# Patient Record
Sex: MALE | Race: WHITE | NOT HISPANIC OR LATINO | Employment: FULL TIME | URBAN - METROPOLITAN AREA
[De-identification: names, ages, dates, MRNs, and addresses within clinical notes are randomized per-mention and may not be internally consistent; named-entity substitution may affect disease eponyms.]

---

## 2023-02-21 LAB — URINE CULTURE: NO GROWTH

## 2023-04-26 DIAGNOSIS — K21.00 GASTROESOPHAGEAL REFLUX DISEASE WITH ESOPHAGITIS WITHOUT HEMORRHAGE: Primary | ICD-10-CM

## 2023-04-26 RX ORDER — PANTOPRAZOLE SODIUM 40 MG/1
40 TABLET, DELAYED RELEASE ORAL
COMMUNITY
End: 2023-04-26 | Stop reason: SDUPTHER

## 2023-04-26 RX ORDER — PANTOPRAZOLE SODIUM 40 MG/1
40 TABLET, DELAYED RELEASE ORAL 2 TIMES DAILY
Qty: 60 TABLET | Refills: 5 | Status: SHIPPED | OUTPATIENT
Start: 2023-04-26 | End: 2023-05-24

## 2023-05-19 DIAGNOSIS — K21.00 GASTROESOPHAGEAL REFLUX DISEASE WITH ESOPHAGITIS WITHOUT HEMORRHAGE: ICD-10-CM

## 2023-05-24 RX ORDER — PANTOPRAZOLE SODIUM 40 MG/1
TABLET, DELAYED RELEASE ORAL
Qty: 60 TABLET | Refills: 5 | Status: SHIPPED | OUTPATIENT
Start: 2023-05-24 | End: 2023-10-16 | Stop reason: SDUPTHER

## 2023-10-16 ENCOUNTER — OFFICE VISIT (OUTPATIENT)
Dept: PRIMARY CARE | Facility: CLINIC | Age: 43
End: 2023-10-16
Payer: COMMERCIAL

## 2023-10-16 VITALS
DIASTOLIC BLOOD PRESSURE: 78 MMHG | BODY MASS INDEX: 32.71 KG/M2 | TEMPERATURE: 97.5 F | OXYGEN SATURATION: 99 % | WEIGHT: 208.4 LBS | HEART RATE: 84 BPM | HEIGHT: 67 IN | SYSTOLIC BLOOD PRESSURE: 122 MMHG

## 2023-10-16 DIAGNOSIS — K21.00 GASTROESOPHAGEAL REFLUX DISEASE WITH ESOPHAGITIS WITHOUT HEMORRHAGE: ICD-10-CM

## 2023-10-16 DIAGNOSIS — F41.1 GENERALIZED ANXIETY DISORDER: Primary | ICD-10-CM

## 2023-10-16 DIAGNOSIS — F84.0 AUTISM (HHS-HCC): ICD-10-CM

## 2023-10-16 PROCEDURE — 1036F TOBACCO NON-USER: CPT | Performed by: NURSE PRACTITIONER

## 2023-10-16 PROCEDURE — 99214 OFFICE O/P EST MOD 30 MIN: CPT | Performed by: NURSE PRACTITIONER

## 2023-10-16 RX ORDER — PANTOPRAZOLE SODIUM 40 MG/1
40 TABLET, DELAYED RELEASE ORAL 2 TIMES DAILY
Qty: 60 TABLET | Refills: 5 | Status: SHIPPED | OUTPATIENT
Start: 2023-10-16 | End: 2024-04-13

## 2023-10-16 RX ORDER — FAMOTIDINE 40 MG/1
40 TABLET, FILM COATED ORAL 2 TIMES DAILY
Qty: 60 TABLET | Refills: 5 | Status: SHIPPED | OUTPATIENT
Start: 2023-10-16 | End: 2023-11-09 | Stop reason: SDUPTHER

## 2023-10-16 ASSESSMENT — PATIENT HEALTH QUESTIONNAIRE - PHQ9
1. LITTLE INTEREST OR PLEASURE IN DOING THINGS: NOT AT ALL
SUM OF ALL RESPONSES TO PHQ9 QUESTIONS 1 AND 2: 0
2. FEELING DOWN, DEPRESSED OR HOPELESS: NOT AT ALL

## 2023-10-16 ASSESSMENT — PAIN SCALES - GENERAL: PAINLEVEL: 0-NO PAIN

## 2023-10-16 ASSESSMENT — ENCOUNTER SYMPTOMS
ABDOMINAL PAIN: 1
VOMITING: 1
DIARRHEA: 1

## 2023-10-16 NOTE — PATIENT INSTRUCTIONS
Resume taking pantoprazole twice daily for stomach upset.  Can use famotidine (pepcid) as needed for heartburn symptoms.  Avoid trigger foods.  Consider referral back to Gastroenterology if not improving.    Refer to [facility] for counseling and Psychiatry  Discussed importance of getting better control of anxiety for better control of CPAP use, stomach symptoms.

## 2023-10-16 NOTE — LETTER
October 16, 2023     Patient: Aaron Goldberg   YOB: 1980   Date of Visit: 10/16/2023       To Whom It May Concern:    Aaron Goldberg was seen in my clinic on 10/16/2023 at 11:20 am. Please excuse James for his absence from work on this day to make the appointment.  Patient may return to work as of 10-17-23.  If you have any questions or concerns, please don't hesitate to call.         Sincerely,         GIOVANNA Serna-CNP        CC: No Recipients

## 2023-10-16 NOTE — PROGRESS NOTES
"Subjective   Patient ID: Aaron Goldberg is a 42 y.o. male who presents for Nausea, Vomiting, Diarrhea, and Abdominal Pain. NMK    ,    Presents for evaluation of abdominal pain, stomach upset, nausea  +gas  +Acid reflux - Denies any current treatment  +diarrhea, no noted fever.  At this time still not feeling well  Seen by GI in the past. EGD done 11/2022  Hiatal hernia, gastritis found. No longer on PPI    Increased urinary frequency, ongoing. No changes from baseline per patient.    Vomiting   Associated symptoms include abdominal pain and diarrhea.   Diarrhea   Associated symptoms include abdominal pain and vomiting.   Abdominal Pain  Associated symptoms include diarrhea and vomiting.        Review of Systems   Gastrointestinal:  Positive for abdominal pain, diarrhea and vomiting.       Objective   /78 (BP Location: Right arm, Patient Position: Sitting, BP Cuff Size: Large adult)   Pulse 84   Temp 36.4 °C (97.5 °F) (Oral)   Ht 1.702 m (5' 7\")   Wt 94.5 kg (208 lb 6.4 oz)   SpO2 99%   BMI 32.64 kg/m²     Physical Exam  Vitals and nursing note reviewed.   Constitutional:       General: He is not in acute distress.     Appearance: Normal appearance.   Cardiovascular:      Rate and Rhythm: Normal rate and regular rhythm.      Pulses: Normal pulses.      Heart sounds: Normal heart sounds. No murmur heard.  Pulmonary:      Effort: Pulmonary effort is normal. No respiratory distress.      Breath sounds: Normal breath sounds.   Abdominal:      General: Bowel sounds are normal.      Palpations: Abdomen is soft.      Tenderness: There is abdominal tenderness (mild diffuse upper abdomen). There is no right CVA tenderness, left CVA tenderness or guarding.   Psychiatric:         Mood and Affect: Affect normal. Mood is anxious.         Behavior: Behavior normal.         Assessment/Plan   Diagnoses and all orders for this visit:  Generalized anxiety disorder  -     Referral to [facility]; " Future  Gastroesophageal reflux disease with esophagitis without hemorrhage  -     pantoprazole (ProtoNix) 40 mg EC tablet; Take 1 tablet (40 mg) by mouth 2 times a day. Do not crush, chew, or split.  -     famotidine (Pepcid) 40 mg tablet; Take 1 tablet (40 mg) by mouth 2 times a day.  Autism  -     Referral to [facility]; Future

## 2023-10-30 PROBLEM — J30.9 ALLERGIC RHINITIS: Status: ACTIVE | Noted: 2023-10-30

## 2023-10-30 PROBLEM — F32.A DEPRESSIVE DISORDER: Status: ACTIVE | Noted: 2022-11-08

## 2023-10-30 PROBLEM — B35.6 TINEA CRURIS: Status: ACTIVE | Noted: 2023-10-30

## 2023-10-30 PROBLEM — G47.33 OBSTRUCTIVE SLEEP APNEA SYNDROME: Status: ACTIVE | Noted: 2018-11-07

## 2023-10-30 PROBLEM — J31.0 CHRONIC RHINITIS: Status: ACTIVE | Noted: 2023-10-30

## 2023-10-30 PROBLEM — F41.9 ANXIETY: Status: ACTIVE | Noted: 2022-11-08

## 2023-10-30 PROBLEM — R23.8 SKIN IRRITATION: Status: ACTIVE | Noted: 2023-10-30

## 2023-10-30 PROBLEM — K29.00 ACUTE GASTRITIS: Status: RESOLVED | Noted: 2023-10-30 | Resolved: 2023-10-30

## 2023-10-30 PROBLEM — F51.04 CHRONIC INSOMNIA: Status: ACTIVE | Noted: 2018-11-07

## 2023-10-30 PROBLEM — R53.83 FATIGUE: Status: ACTIVE | Noted: 2022-11-08

## 2023-10-30 PROBLEM — S93.409A ANKLE SPRAIN: Status: ACTIVE | Noted: 2023-10-30

## 2023-10-30 PROBLEM — K29.00 ACUTE GASTRITIS: Status: ACTIVE | Noted: 2023-10-30

## 2023-10-30 PROBLEM — R35.0 URINARY FREQUENCY: Status: ACTIVE | Noted: 2023-10-30

## 2023-10-30 PROBLEM — R06.83 SNORING: Status: ACTIVE | Noted: 2023-10-30

## 2023-10-30 RX ORDER — TRAZODONE HYDROCHLORIDE 100 MG/1
200 TABLET ORAL NIGHTLY
COMMUNITY

## 2023-10-30 RX ORDER — BUSPIRONE HYDROCHLORIDE 5 MG/1
5 TABLET ORAL 2 TIMES DAILY
COMMUNITY

## 2023-10-30 RX ORDER — FLUOXETINE HYDROCHLORIDE 20 MG/1
20 CAPSULE ORAL DAILY
COMMUNITY
Start: 2023-10-16

## 2023-10-30 RX ORDER — FLUOXETINE HYDROCHLORIDE 40 MG/1
40 CAPSULE ORAL DAILY
COMMUNITY
Start: 2023-10-16

## 2023-10-31 ENCOUNTER — OFFICE VISIT (OUTPATIENT)
Dept: BEHAVIORAL HEALTH | Facility: CLINIC | Age: 43
End: 2023-10-31
Payer: COMMERCIAL

## 2023-10-31 DIAGNOSIS — G47.9 DISTURBANCE IN SLEEP BEHAVIOR: ICD-10-CM

## 2023-10-31 DIAGNOSIS — F32.A DEPRESSIVE DISORDER: ICD-10-CM

## 2023-10-31 DIAGNOSIS — F41.1 GENERALIZED ANXIETY DISORDER: Primary | ICD-10-CM

## 2023-10-31 DIAGNOSIS — F84.0 AUTISM (HHS-HCC): ICD-10-CM

## 2023-10-31 PROCEDURE — 1036F TOBACCO NON-USER: CPT

## 2023-10-31 PROCEDURE — 90792 PSYCH DIAG EVAL W/MED SRVCS: CPT

## 2023-10-31 NOTE — PROGRESS NOTES
Outpatient Psychiatry    Subjective   Aaron Goldberg, a 42 y.o. male, presenting to [facility] for evaluation.  Patient is referred by ANDREE Serna for anxiety.      HPI:  Stated that their primary concern is insomnia, 3-4 hours a night. Complains of not feeling well rested in the mornings. Often experiences nighttime awakenings to use the bathroom and then has difficulty falling back asleep. He has AIDE and uses CPAP routinely.     This has been going on for 3 weeks. He has experienced this in the past, it typically comes and goes. He has trazodone 100-200mg as needed for insomnia but does not like to use it because he is afraid he will become dependent. Of note, he recently used trazodone twice in the last week which resulted in better sleep.    He sees H[facility] CNP at [facility], who currently manages his psychotropic medications. He is prescribed fluoxetine 60mg, buspar 5mg BID and trazodone 200mg at bedtime. He last saw her on 10/9/23 and does not have scheduled follow up yet.    He reported medication adherence with fluoxetine for several months and only uses buspirone sporadically.     He reports anxiety which has recently been worse over the last 3 weeks in conjunction with the insomnia.     He denied any recent stressors but reports financial stress as an ongoing stressor for several years.     Regarding recent medical problems detailed at recent encounter with primary provider he said that nausea, vomiting and diarrhea have resolved. He said that he was also experiencing significant acid reflux but that it     PSYCHIATRIC REVIEW OF SYSTEMS  Depression: depressed mood, difficulty concentrating, thinking or making decisions, and sleep disturbance: average hours of sleep per night 3-4 and frequent awakening  Anxiety: excessive worry that is difficult to control, restlessness or feeling keyed up or on edge, fatigue, irritability, sleep disturbance, and panic attacks:  episodes of palpitations, tachycardia, shortness of breath , and x1-2/month   Crys: negative  Psychosis: negative  Delirium: negative   Trauma: negative     OCD: following schedules, start certain anxieties,   Current Medications:    Current Outpatient Medications:     acetaminophen 500 mg powder in packet, Take by mouth., Disp: , Rfl:     busPIRone (Buspar) 5 mg tablet, Take 1 tablet (5 mg) by mouth 2 times a day., Disp: , Rfl:     famotidine (Pepcid) 40 mg tablet, Take 1 tablet (40 mg) by mouth 2 times a day., Disp: 60 tablet, Rfl: 5    FLUoxetine (PROzac) 20 mg capsule, Take 1 capsule (20 mg) by mouth once daily., Disp: , Rfl:     FLUoxetine (PROzac) 40 mg capsule, Take 1 capsule (40 mg) by mouth once daily., Disp: , Rfl:     pantoprazole (ProtoNix) 40 mg EC tablet, Take 1 tablet (40 mg) by mouth 2 times a day. Do not crush, chew, or split., Disp: 60 tablet, Rfl: 5    traZODone (Desyrel) 100 mg tablet, Take 2 tablets (200 mg) by mouth once daily at bedtime., Disp: , Rfl:     Medical History:  Past Medical History:   Diagnosis Date    Personal history of other specified conditions 07/17/2022    History of diarrhea     Past Psychiatric History:   Diagnoses: MDD, RANDOLPH, OCD, autism spectrum disorder   Current Psychiatrist: Radha Peterson ([facility]), has been seeing her since February 2021  Therapy: none currently, negative experiences previously   Current psychiatric medications: buspirone 5mg BID, trazodone 200mg QHS  Past psychiatric medications: fluoxetine, seroquel, depakote, sertraline, escitalopram,   Past psychiatric treatments:   Hospitalizations: yes, 2011 for extreme anxiety at [facility]   Suicide attempts: denied  Family psychiatric history: bipolar disorder (mother)    Social History:   Currently lives: in an apartment in [place], lives alone  Education: English degree from [employer]  History of Learning Problem:  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  Behavior: Appropriate eye contact.   Motor Activity: No psychomotor agitation or retardation. No abnormal movements, tremors or tics. Gait not assessed.  Speech: Regular rate, rhythm, volume. Spontaneous, no pressured speech.  Mood: \"Okay\"  Affect: Restricted  Thought Process: Linear, logical, and goal-directed.   Thought Content: Denied current suicidal ideation or thoughts of harm to self, denied homicidal ideation or thoughts of harm to others. No delusional thinking elicited. No perseverations or obsessions identified.   Perception: Did not endorse auditory or visual hallucinations, did not appear to be responding to hallucinatory stimuli.   Cognition: Alert, oriented x3. Preserved attention span and concentration, recent and remote memory. Adequate fund of knowledge. No deficits in language.   Insight: Fair, in regards to understanding mental health condition  Judgement: Poor, reports ongoing anxiety and medication non-adherence.    Vitals:  There were no vitals filed for this visit.    Assessment/Plan   Aaron Goldberg, a 42 y.o. male with past psychiatric history of MDD, RANDOLPH, OCD and autism spectrum disorder, presenting to [facility] for evaluation. Patient is referred by ANDREE Serna for anxiety.      On initial assessment, patient endorsed ongoing depressed mood and anxiety in the setting " of medication non-adherence with prescribed psychotropic medications. He reported his primary concern as insomnia which has been exacerbated over the last 3 weeks. He is adherent with his CPAP but reports using trazodone as needed for insomnia sporadically. He would likely benefit from restarting fluoxetine and buspirone for depressed mood and anxiety but benefit could take 2-4 weeks. Instructed to use trazodone as needed in the interim. Additionally, discussed therapeutic benefit of psychotherapy but patient not interested in referral at this time due to negative experiences in the past. Discussed that he should reach out to his mental health provider to restart fluoxetine and titrate to an effective dose. No acute safety concerns at this time such as suicidal ideation.     Impression:  MDD   RANDOLPH   OCD   Autism spectrum disorder    Risk Assessment:  Risk of harm to self: Low Risk -- Risk factors include: Depression, History of not adhering to treatment or medication regimen , Psychosocial stressors including finances , and Severe anxiety Protective factors include:Denies current suicidal ideation, Denies history of suicide attempts , and Future-oriented talk     Risk of harm to others: Low Risk - Risk factors include: Gender. Protective factors include: Lack of known history of harm to others , Lack of known history of violent ideation , and Lack of known access to firearms     Plan/Recommendations:  Medications: reach out to Haily Peterson CNP at [facility] to restart fluoxetine and continue currently prescribed psychotropic medications as prescribed  Follow up: return to PCP for medical care and Haily Peterson CNP for further psychiatric care   Call  Psychiatry at [phone] with issues.  For [facility] residents, [employer] is a 24/7 hotline you can call for assistance [[phone]]. Please call 237/599 or go to your closest Emergency Room if you feel unsafe. This includes thoughts of  hurting yourself or anyone else, or having other troubles such as hearing voices, seeing visions, or having new and scary thoughts about the people around you.    Review with patient: Treatment plan reviewed with the patient.    Seen and discussed with attending, Dr. Thompson, who agrees with above.     Brock Espinal MD

## 2023-11-07 ENCOUNTER — TELEPHONE (OUTPATIENT)
Dept: PRIMARY CARE | Facility: CLINIC | Age: 43
End: 2023-11-07
Payer: COMMERCIAL

## 2023-11-09 DIAGNOSIS — K21.00 GASTROESOPHAGEAL REFLUX DISEASE WITH ESOPHAGITIS WITHOUT HEMORRHAGE: ICD-10-CM

## 2023-11-09 RX ORDER — FAMOTIDINE 40 MG/1
40 TABLET, FILM COATED ORAL 2 TIMES DAILY
Qty: 180 TABLET | Refills: 1 | Status: SHIPPED | OUTPATIENT
Start: 2023-11-09 | End: 2024-05-07

## 2023-12-22 ENCOUNTER — OFFICE VISIT (OUTPATIENT)
Dept: PRIMARY CARE | Facility: CLINIC | Age: 43
End: 2023-12-22
Payer: COMMERCIAL

## 2023-12-22 VITALS
OXYGEN SATURATION: 97 % | WEIGHT: 208 LBS | HEIGHT: 67 IN | BODY MASS INDEX: 32.65 KG/M2 | TEMPERATURE: 98.2 F | SYSTOLIC BLOOD PRESSURE: 120 MMHG | HEART RATE: 90 BPM | DIASTOLIC BLOOD PRESSURE: 78 MMHG

## 2023-12-22 DIAGNOSIS — R51.9 NONINTRACTABLE EPISODIC HEADACHE, UNSPECIFIED HEADACHE TYPE: Primary | ICD-10-CM

## 2023-12-22 DIAGNOSIS — K21.00 GASTROESOPHAGEAL REFLUX DISEASE WITH ESOPHAGITIS WITHOUT HEMORRHAGE: ICD-10-CM

## 2023-12-22 DIAGNOSIS — J01.10 ACUTE FRONTAL SINUSITIS, RECURRENCE NOT SPECIFIED: ICD-10-CM

## 2023-12-22 DIAGNOSIS — F51.04 CHRONIC INSOMNIA: ICD-10-CM

## 2023-12-22 PROCEDURE — 1036F TOBACCO NON-USER: CPT | Performed by: NURSE PRACTITIONER

## 2023-12-22 PROCEDURE — 99214 OFFICE O/P EST MOD 30 MIN: CPT | Performed by: NURSE PRACTITIONER

## 2023-12-22 RX ORDER — AMOXICILLIN AND CLAVULANATE POTASSIUM 875; 125 MG/1; MG/1
875 TABLET, FILM COATED ORAL 2 TIMES DAILY
Qty: 20 TABLET | Refills: 0 | Status: SHIPPED | OUTPATIENT
Start: 2023-12-22 | End: 2024-01-01

## 2023-12-22 ASSESSMENT — ENCOUNTER SYMPTOMS
DEPRESSION: 0
OCCASIONAL FEELINGS OF UNSTEADINESS: 0
LOSS OF SENSATION IN FEET: 0

## 2023-12-22 ASSESSMENT — PATIENT HEALTH QUESTIONNAIRE - PHQ9
2. FEELING DOWN, DEPRESSED OR HOPELESS: NOT AT ALL
1. LITTLE INTEREST OR PLEASURE IN DOING THINGS: NOT AT ALL
SUM OF ALL RESPONSES TO PHQ9 QUESTIONS 1 AND 2: 0

## 2023-12-22 ASSESSMENT — PAIN SCALES - GENERAL: PAINLEVEL: 0-NO PAIN

## 2023-12-22 NOTE — PATIENT INSTRUCTIONS
Headaches: Appears to be related to sinus infection.  Will start Augmentin for 10 days for infection.  Increase fluids while treating.  Encouraged to follow up with dental for routine dental exam and cleaning.  Follow up if headaches not improving with treating sinus symptoms.    Acid reflux: Recommend resuming pantoprazole daily.  Can continue to use famotidine, as needed for symptom flares.  Discussed importance of diet modification of avoidance of trigger foods to prevent flares.  Avoid caffeine, tobacco, alcohol, spicy foods (hot sauces, onions, garlic), fried foods, fatty foods, acidic foods (tomato, tomato sauces, citrus fruits and juices).  Avoid large meals. Avoid overeating.  Continue to sit up for at least 2-4 hours after eating.  Sleep with head of bed at least at 30 degrees to prevent reflux symptoms.  Consider H. Pylori testing - defer today due to antibiotic use.    Insomnia: Improving. Will watch with treating infection and reflux

## 2023-12-22 NOTE — LETTER
December 22, 2023     Patient: Aaron Goldberg   YOB: 1980   Date of Visit: 12/22/2023       To Whom It May Concern:    Aaron Goldberg was seen in my clinic on 12/22/2023. Please excuse James for his absence from work on this day to make the appointment.    If you have any questions or concerns, please don't hesitate to call.         Sincerely,         GIOVANNA Serna-CNP        CC: No Recipients

## 2023-12-22 NOTE — PROGRESS NOTES
"Subjective   Patient ID: Aaron Goldberg is a 43 y.o. male who presents for Headache, Stress, and Anxiety.    Presents for worsening recurrence of headaches.  Headache located in Middle forehead, over right frontal area. New watery eyes (left).  Recently had eye exam, new glasses --> No change in headaches.   Headaches are generally constant, worsening.   Using OTC tylenol, does not always help  Has used ice compression, works better than tylenol  Headaches on occasion, waking with them but worsening over the day  Denies any vision change, denies any nausea, vomiting.   +Nasal congestion, some but does not feel related  Eating regularly - eating less due to weight  Sleep is better  - trazodone has helped  Still wearing CPAP - on some days still getting up to pee overnight  Bad sleep still - using Z coumpound    Continues to have heartburn intermittently. Inconsistent triggers.  Is not currently taking any medication for it.  Does consume trigger foods.   EGD, GI testing reviewed from late 2022, GI notes.  Symptoms associated with belching, coughing.       Review of Systems    Objective   /78 (BP Location: Right arm, Patient Position: Sitting, BP Cuff Size: Large adult)   Pulse 90   Temp 36.8 °C (98.2 °F) (Temporal)   Ht 1.702 m (5' 7\")   Wt 94.3 kg (208 lb)   SpO2 97%   BMI 32.58 kg/m²     Physical Exam  Vitals and nursing note reviewed.   Constitutional:       General: He is not in acute distress.     Appearance: Normal appearance.   HENT:      Head: Normocephalic.      Right Ear: Ear canal and external ear normal. A middle ear effusion is present.      Left Ear: Tympanic membrane, ear canal and external ear normal.      Nose: Congestion present.      Right Turbinates: Swollen.      Left Turbinates: Swollen.      Right Sinus: Frontal sinus tenderness present.      Mouth/Throat:      Mouth: Mucous membranes are moist.   Eyes:      Conjunctiva/sclera: Conjunctivae normal.   Cardiovascular:      Rate and " Rhythm: Normal rate and regular rhythm.      Heart sounds: Normal heart sounds. No murmur heard.  Pulmonary:      Effort: Pulmonary effort is normal. No respiratory distress.      Breath sounds: Normal breath sounds.   Lymphadenopathy:      Cervical: Cervical adenopathy (R>L) present.   Psychiatric:         Mood and Affect: Mood normal.         Assessment/Plan   Diagnoses and all orders for this visit:  Nonintractable episodic headache, unspecified headache type  Acute frontal sinusitis, recurrence not specified  -     amoxicillin-pot clavulanate (Augmentin) 875-125 mg tablet; Take 1 tablet (875 mg) by mouth 2 times a day for 10 days.  Gastroesophageal reflux disease with esophagitis without hemorrhage  Chronic insomnia

## 2024-01-19 ENCOUNTER — OFFICE VISIT (OUTPATIENT)
Dept: PRIMARY CARE | Facility: CLINIC | Age: 44
End: 2024-01-19
Payer: COMMERCIAL

## 2024-01-19 VITALS
HEIGHT: 68 IN | HEART RATE: 72 BPM | BODY MASS INDEX: 30.74 KG/M2 | WEIGHT: 202.8 LBS | SYSTOLIC BLOOD PRESSURE: 116 MMHG | OXYGEN SATURATION: 96 % | DIASTOLIC BLOOD PRESSURE: 70 MMHG

## 2024-01-19 DIAGNOSIS — L30.9 ECZEMA, UNSPECIFIED TYPE: ICD-10-CM

## 2024-01-19 DIAGNOSIS — J30.9 ALLERGIC RHINITIS, UNSPECIFIED SEASONALITY, UNSPECIFIED TRIGGER: Primary | ICD-10-CM

## 2024-01-19 PROCEDURE — 99213 OFFICE O/P EST LOW 20 MIN: CPT | Performed by: NURSE PRACTITIONER

## 2024-01-19 PROCEDURE — 1036F TOBACCO NON-USER: CPT | Performed by: NURSE PRACTITIONER

## 2024-01-19 RX ORDER — FLUTICASONE PROPIONATE 50 MCG
1 SPRAY, SUSPENSION (ML) NASAL DAILY
Qty: 16 G | Refills: 5 | Status: SHIPPED | OUTPATIENT
Start: 2024-01-19 | End: 2025-01-18

## 2024-01-19 RX ORDER — TRIAMCINOLONE ACETONIDE 1 MG/G
CREAM TOPICAL 2 TIMES DAILY
Qty: 80 G | Refills: 2 | Status: SHIPPED | OUTPATIENT
Start: 2024-01-19

## 2024-01-19 NOTE — PROGRESS NOTES
"Subjective   Patient ID: Aaron Goldberg is a 43 y.o. male who presents for Eye Problem and Anxiety (Patient Presented with Puffiness in Both eyes More In Left eye. Patient has not Been Sleeping well but Sleeps Better With the c-jose rafael machine.  Patient has Compliant Of Dry skin he Has Exzema states the symptoms are worse this Year.).    Following up for anxiety.  Still not sleeping well, but admits has been better with using CPAP.  Ongoing eye puffiness, L>R  Headaches much better since antibiotic treatment.  Seen and treated for sinus infection one month ago, pressure improved after treatment, but eyes still not normal.  Eyes itching, feels pressure behind.  +sneezing, no watery eyes.  Does rub them a lot.  Has not tried any eye drops. Cannot use eye drops per patient.  Did well with Flonase in the past.    Continues to have dry skin, using Vaseline at work.   Eczema symptoms worse this year. Has had to start using at home.   Has itching and cracks in skin.     Both eyes but more on the left side      Review of Systems    Objective   /70 (BP Location: Left arm, Patient Position: Sitting, BP Cuff Size: Adult)   Pulse 72   Ht 1.727 m (5' 8\")   Wt 92 kg (202 lb 12.8 oz)   SpO2 96%   BMI 30.84 kg/m²     Physical Exam  Vitals and nursing note reviewed.   Constitutional:       General: He is not in acute distress.     Appearance: Normal appearance. He is not toxic-appearing.   HENT:      Head: Normocephalic.      Right Ear: Ear canal and external ear normal. A middle ear effusion is present.      Left Ear: Ear canal and external ear normal. A middle ear effusion is present.      Nose: Rhinorrhea present. No congestion.      Mouth/Throat:      Lips: Pink.      Mouth: Mucous membranes are moist.      Pharynx: Oropharynx is clear. No posterior oropharyngeal erythema.   Eyes:      General: Lids are normal. Allergic shiner present.         Right eye: No discharge.         Left eye: No discharge.      " Conjunctiva/sclera:      Right eye: Right conjunctiva is not injected.      Left eye: Left conjunctiva is not injected.   Cardiovascular:      Rate and Rhythm: Normal rate and regular rhythm.      Heart sounds: Normal heart sounds. No murmur heard.  Pulmonary:      Effort: Pulmonary effort is normal. No respiratory distress.      Breath sounds: Normal breath sounds.   Lymphadenopathy:      Cervical: No cervical adenopathy.   Skin:     General: Skin is warm.      Findings: Rash (bilateral hand eczema) present.   Psychiatric:         Mood and Affect: Mood normal.         Assessment/Plan   Diagnoses and all orders for this visit:  Allergic rhinitis, unspecified seasonality, unspecified trigger  -     fluticasone (Flonase) 50 mcg/actuation nasal spray; Administer 1 spray into each nostril once daily. Shake gently. Before first use, prime pump. After use, clean tip and replace cap.  Eczema, unspecified type  -     triamcinolone (Kenalog) 0.1 % cream; Apply topically 2 times a day. Apply to affected area 1-2 times daily as needed.

## 2024-01-19 NOTE — PATIENT INSTRUCTIONS
Eczema: Continue to use Vaseline throughout the day.  Use Triamcinolone twice daily to help heal eczema rash.  Avoid using harsh soaps on hands, when able.    Start flonase nasal spray for allergy symptoms.  Consider addition of eye drops if eye itching not improving with allergy treatment.

## 2024-01-19 NOTE — LETTER
January 19, 2024     Patient: Aaron Goldberg   YOB: 1980   Date of Visit: 1/19/2024       To Whom It May Concern:    Aaron Goldberg was seen in my clinic on 1/19/2024. Please excuse James for his absence from work on this day to make the appointment. He may return to work 1/22/2024.    If you have any questions or concerns, please don't hesitate to call.         Sincerely,         GIOVANNA Serna-CNP        CC: No Recipients

## 2024-02-13 DIAGNOSIS — H10.13 ALLERGIC CONJUNCTIVITIS OF BOTH EYES: Primary | ICD-10-CM

## 2024-03-19 ENCOUNTER — APPOINTMENT (OUTPATIENT)
Dept: OPHTHALMOLOGY | Facility: CLINIC | Age: 44
End: 2024-03-19
Payer: COMMERCIAL

## 2024-07-01 ENCOUNTER — APPOINTMENT (OUTPATIENT)
Dept: OPHTHALMOLOGY | Facility: CLINIC | Age: 44
End: 2024-07-01
Payer: COMMERCIAL

## 2024-07-09 ENCOUNTER — TELEPHONE (OUTPATIENT)
Dept: PRIMARY CARE | Facility: CLINIC | Age: 44
End: 2024-07-09
Payer: COMMERCIAL

## 2024-07-09 DIAGNOSIS — R35.0 URINARY FREQUENCY: Primary | ICD-10-CM

## 2024-07-09 NOTE — TELEPHONE ENCOUNTER
Patient called wanting to know if they can start oxybutin. Psychiatrist wants to make sure everything is okay before they start

## 2024-07-29 ENCOUNTER — LAB (OUTPATIENT)
Dept: LAB | Facility: LAB | Age: 44
End: 2024-07-29
Payer: COMMERCIAL

## 2024-07-29 DIAGNOSIS — R35.0 URINARY FREQUENCY: ICD-10-CM

## 2024-07-29 LAB
ALBUMIN SERPL BCP-MCNC: 4.4 G/DL (ref 3.4–5)
ALP SERPL-CCNC: 66 U/L (ref 33–120)
ALT SERPL W P-5'-P-CCNC: 14 U/L (ref 10–52)
ANION GAP SERPL CALC-SCNC: 14 MMOL/L (ref 10–20)
AST SERPL W P-5'-P-CCNC: 14 U/L (ref 9–39)
BASOPHILS # BLD AUTO: 0.04 X10*3/UL (ref 0–0.1)
BASOPHILS NFR BLD AUTO: 0.5 %
BILIRUB SERPL-MCNC: 0.3 MG/DL (ref 0–1.2)
BUN SERPL-MCNC: 10 MG/DL (ref 6–23)
CALCIUM SERPL-MCNC: 9.5 MG/DL (ref 8.6–10.6)
CHLORIDE SERPL-SCNC: 103 MMOL/L (ref 98–107)
CO2 SERPL-SCNC: 26 MMOL/L (ref 21–32)
CREAT SERPL-MCNC: 0.63 MG/DL (ref 0.5–1.3)
EGFRCR SERPLBLD CKD-EPI 2021: >90 ML/MIN/1.73M*2
EOSINOPHIL # BLD AUTO: 0.06 X10*3/UL (ref 0–0.7)
EOSINOPHIL NFR BLD AUTO: 0.8 %
ERYTHROCYTE [DISTWIDTH] IN BLOOD BY AUTOMATED COUNT: 11.3 % (ref 11.5–14.5)
GLUCOSE SERPL-MCNC: 107 MG/DL (ref 74–99)
HCT VFR BLD AUTO: 44.1 % (ref 41–52)
HGB BLD-MCNC: 15 G/DL (ref 13.5–17.5)
IMM GRANULOCYTES # BLD AUTO: 0.02 X10*3/UL (ref 0–0.7)
IMM GRANULOCYTES NFR BLD AUTO: 0.3 % (ref 0–0.9)
LYMPHOCYTES # BLD AUTO: 1.38 X10*3/UL (ref 1.2–4.8)
LYMPHOCYTES NFR BLD AUTO: 18.4 %
MCH RBC QN AUTO: 29.5 PG (ref 26–34)
MCHC RBC AUTO-ENTMCNC: 34 G/DL (ref 32–36)
MCV RBC AUTO: 87 FL (ref 80–100)
MONOCYTES # BLD AUTO: 0.52 X10*3/UL (ref 0.1–1)
MONOCYTES NFR BLD AUTO: 6.9 %
NEUTROPHILS # BLD AUTO: 5.47 X10*3/UL (ref 1.2–7.7)
NEUTROPHILS NFR BLD AUTO: 73.1 %
NRBC BLD-RTO: 0 /100 WBCS (ref 0–0)
PLATELET # BLD AUTO: 291 X10*3/UL (ref 150–450)
POTASSIUM SERPL-SCNC: 4 MMOL/L (ref 3.5–5.3)
PROT SERPL-MCNC: 7.2 G/DL (ref 6.4–8.2)
PSA SERPL-MCNC: 0.55 NG/ML
RBC # BLD AUTO: 5.09 X10*6/UL (ref 4.5–5.9)
SODIUM SERPL-SCNC: 139 MMOL/L (ref 136–145)
WBC # BLD AUTO: 7.5 X10*3/UL (ref 4.4–11.3)

## 2024-07-29 PROCEDURE — 84153 ASSAY OF PSA TOTAL: CPT

## 2024-07-29 PROCEDURE — 85025 COMPLETE CBC W/AUTO DIFF WBC: CPT

## 2024-07-29 PROCEDURE — 80053 COMPREHEN METABOLIC PANEL: CPT

## 2024-07-29 PROCEDURE — 36415 COLL VENOUS BLD VENIPUNCTURE: CPT

## 2024-08-09 ENCOUNTER — APPOINTMENT (OUTPATIENT)
Dept: OPHTHALMOLOGY | Facility: CLINIC | Age: 44
End: 2024-08-09
Payer: COMMERCIAL

## 2024-08-09 DIAGNOSIS — Z98.890 S/P LASIK SURGERY: ICD-10-CM

## 2024-08-09 DIAGNOSIS — H52.13 MYOPIA OF BOTH EYES: Primary | ICD-10-CM

## 2024-08-09 PROCEDURE — 92004 COMPRE OPH EXAM NEW PT 1/>: CPT | Performed by: OPHTHALMOLOGY

## 2024-08-09 PROCEDURE — 92015 DETERMINE REFRACTIVE STATE: CPT | Performed by: OPHTHALMOLOGY

## 2024-08-09 RX ORDER — DOXEPIN HYDROCHLORIDE 25 MG/1
25 CAPSULE ORAL NIGHTLY
COMMUNITY
Start: 2024-07-05

## 2024-08-09 RX ORDER — SERTRALINE HYDROCHLORIDE 50 MG/1
50 TABLET, FILM COATED ORAL NIGHTLY
COMMUNITY
Start: 2024-07-23

## 2024-08-09 RX ORDER — AMITRIPTYLINE HYDROCHLORIDE 25 MG/1
1 TABLET, FILM COATED ORAL
COMMUNITY
Start: 2024-07-23

## 2024-08-09 ASSESSMENT — REFRACTION_MANIFEST
OS_AXIS: 050
OS_SPHERE: -4.00
OD_CYLINDER: -0.75
OS_ADD: +1.25
OD_ADD: +1.25
OD_SPHERE: -5.00
OD_AXIS: 155
OS_CYLINDER: -0.75

## 2024-08-09 ASSESSMENT — VISUAL ACUITY
OS_CC+: -2
OD_CC+: -1
CORRECTION_TYPE: GLASSES
METHOD: SNELLEN - LINEAR
OS_CC: 20/20
OD_CC: 20/20

## 2024-08-09 ASSESSMENT — REFRACTION_WEARINGRX
OS_CYLINDER: -0.25
OD_AXIS: 155
OS_AXIS: 043
OD_ADD: +1.00
OD_CYLINDER: -0.25
OS_SPHERE: -4.25
OS_ADD: +1.00
OD_SPHERE: -5.25

## 2024-08-09 ASSESSMENT — CONF VISUAL FIELD
OD_SUPERIOR_TEMPORAL_RESTRICTION: 0
OS_INFERIOR_TEMPORAL_RESTRICTION: 0
METHOD: COUNTING FINGERS
OD_NORMAL: 1
OS_SUPERIOR_NASAL_RESTRICTION: 0
OS_SUPERIOR_TEMPORAL_RESTRICTION: 0
OD_INFERIOR_TEMPORAL_RESTRICTION: 0
OD_INFERIOR_NASAL_RESTRICTION: 0
OS_NORMAL: 1
OD_SUPERIOR_NASAL_RESTRICTION: 0
OS_INFERIOR_NASAL_RESTRICTION: 0

## 2024-08-09 ASSESSMENT — ENCOUNTER SYMPTOMS
RESPIRATORY NEGATIVE: 0
HEMATOLOGIC/LYMPHATIC NEGATIVE: 0
NEUROLOGICAL NEGATIVE: 0
ALLERGIC/IMMUNOLOGIC NEGATIVE: 0
EYES NEGATIVE: 0
GASTROINTESTINAL NEGATIVE: 0
ENDOCRINE NEGATIVE: 0
PSYCHIATRIC NEGATIVE: 0
CONSTITUTIONAL NEGATIVE: 0
CARDIOVASCULAR NEGATIVE: 0
MUSCULOSKELETAL NEGATIVE: 0

## 2024-08-09 ASSESSMENT — TONOMETRY
OD_IOP_MMHG: 15
OS_IOP_MMHG: 15
IOP_METHOD: GOLDMANN APPLANATION

## 2024-08-09 ASSESSMENT — SLIT LAMP EXAM - LIDS
COMMENTS: NORMAL
COMMENTS: NORMAL

## 2024-08-09 ASSESSMENT — EXTERNAL EXAM - LEFT EYE: OS_EXAM: NORMAL

## 2024-08-09 ASSESSMENT — EXTERNAL EXAM - RIGHT EYE: OD_EXAM: NORMAL

## 2024-08-09 NOTE — PROGRESS NOTES
Assessment/Plan   Diagnoses and all orders for this visit:  Myopia of both eyes  S/P LASIK surgery  Glasses prescription given to patient today   Discussed symptoms of hole tear and retinal detachment:floaters, flashes, curtain or veil over vision or missing areas of vision  Call for any new symptoms or vision loss

## 2024-10-24 ENCOUNTER — APPOINTMENT (OUTPATIENT)
Dept: PRIMARY CARE | Facility: CLINIC | Age: 44
End: 2024-10-24
Payer: COMMERCIAL

## 2024-10-30 ENCOUNTER — APPOINTMENT (OUTPATIENT)
Dept: PRIMARY CARE | Facility: CLINIC | Age: 44
End: 2024-10-30
Payer: COMMERCIAL

## 2024-10-30 VITALS
SYSTOLIC BLOOD PRESSURE: 122 MMHG | WEIGHT: 202 LBS | BODY MASS INDEX: 30.62 KG/M2 | HEART RATE: 74 BPM | DIASTOLIC BLOOD PRESSURE: 74 MMHG | OXYGEN SATURATION: 96 % | TEMPERATURE: 97.1 F | HEIGHT: 68 IN

## 2024-10-30 DIAGNOSIS — Z00.00 ROUTINE GENERAL MEDICAL EXAMINATION AT A HEALTH CARE FACILITY: Primary | ICD-10-CM

## 2024-10-30 DIAGNOSIS — E66.811 CLASS 1 OBESITY DUE TO EXCESS CALORIES WITHOUT SERIOUS COMORBIDITY WITH BODY MASS INDEX (BMI) OF 30.0 TO 30.9 IN ADULT: ICD-10-CM

## 2024-10-30 DIAGNOSIS — E66.09 CLASS 1 OBESITY DUE TO EXCESS CALORIES WITHOUT SERIOUS COMORBIDITY WITH BODY MASS INDEX (BMI) OF 30.0 TO 30.9 IN ADULT: ICD-10-CM

## 2024-10-30 DIAGNOSIS — L30.9 DERMATITIS: ICD-10-CM

## 2024-10-30 DIAGNOSIS — J30.9 ALLERGIC RHINITIS, UNSPECIFIED SEASONALITY, UNSPECIFIED TRIGGER: ICD-10-CM

## 2024-10-30 DIAGNOSIS — K21.00 GASTROESOPHAGEAL REFLUX DISEASE WITH ESOPHAGITIS WITHOUT HEMORRHAGE: ICD-10-CM

## 2024-10-30 DIAGNOSIS — Z23 ENCOUNTER FOR IMMUNIZATION: ICD-10-CM

## 2024-10-30 PROCEDURE — 99396 PREV VISIT EST AGE 40-64: CPT | Performed by: NURSE PRACTITIONER

## 2024-10-30 PROCEDURE — 90480 ADMN SARSCOV2 VAC 1/ONLY CMP: CPT | Performed by: NURSE PRACTITIONER

## 2024-10-30 PROCEDURE — 3008F BODY MASS INDEX DOCD: CPT | Performed by: NURSE PRACTITIONER

## 2024-10-30 PROCEDURE — 90656 IIV3 VACC NO PRSV 0.5 ML IM: CPT | Performed by: NURSE PRACTITIONER

## 2024-10-30 PROCEDURE — 90471 IMMUNIZATION ADMIN: CPT | Performed by: NURSE PRACTITIONER

## 2024-10-30 PROCEDURE — 91320 SARSCV2 VAC 30MCG TRS-SUC IM: CPT | Performed by: NURSE PRACTITIONER

## 2024-10-30 RX ORDER — FLUTICASONE PROPIONATE 50 MCG
1 SPRAY, SUSPENSION (ML) NASAL DAILY
Qty: 16 G | Refills: 5 | Status: SHIPPED | OUTPATIENT
Start: 2024-10-30 | End: 2025-10-30

## 2024-10-30 RX ORDER — NYSTATIN AND TRIAMCINOLONE ACETONIDE 100000; 1 [USP'U]/G; MG/G
CREAM TOPICAL 2 TIMES DAILY
Qty: 60 G | Refills: 1 | Status: SHIPPED | OUTPATIENT
Start: 2024-10-30

## 2024-10-30 RX ORDER — DEXLANSOPRAZOLE 60 MG/1
60 CAPSULE, DELAYED RELEASE ORAL DAILY
Qty: 30 CAPSULE | Refills: 5 | Status: SHIPPED | OUTPATIENT
Start: 2024-10-30 | End: 2025-04-28

## 2024-10-30 ASSESSMENT — ENCOUNTER SYMPTOMS
SHORTNESS OF BREATH: 0
DIARRHEA: 0
NAUSEA: 0
MYALGIAS: 0
PALPITATIONS: 0
ARTHRALGIAS: 0
VOMITING: 0
UNEXPECTED WEIGHT CHANGE: 0
FREQUENCY: 0
APPETITE CHANGE: 0
BACK PAIN: 0
LOSS OF SENSATION IN FEET: 0
CONSTIPATION: 0
FATIGUE: 0
OCCASIONAL FEELINGS OF UNSTEADINESS: 0
COUGH: 0
BLOOD IN STOOL: 0
DEPRESSION: 0
NERVOUS/ANXIOUS: 0
DYSURIA: 0

## 2024-10-30 ASSESSMENT — PAIN SCALES - GENERAL: PAINLEVEL_OUTOF10: 0-NO PAIN

## 2024-10-30 NOTE — LETTER
October 30, 2024     Patient: Aaron Goldberg   YOB: 1980   Date of Visit: 10/30/2024       To Whom It May Concern:    Aaron Goldberg was seen in my clinic on 10/30/2024 at 3:00 pm. Please excuse James for his absence from work on this day to make the appointment.    If you have any questions or concerns, please don't hesitate to call.         Sincerely,         Heidi Yoo, GIOVANNA-CNP        CC: No Recipients

## 2024-10-30 NOTE — PATIENT INSTRUCTIONS
It is important that you have yearly check-ups with your healthcare provider to ensure that you stay up to date on your health, screenings, and vaccinations, even if you feel healthy.     Make sure you eat a diet rich in fruits, vegetables, nuts, beans, whole grains, lean meat, eggs, calcium, and good fats (i.e. olive oil, avocado baked fish). AVOID a diet that is high in red meat, trans- and saturated fats, sweetened beverages, and refined grains (i.e. white bread, rice, sweetened cereals).     Stay hydrated with at least 64 ounces of water daily.    Exercise most days per week, for at least 30-45 minutes per session.     Be sure to wear sunscreen of SPF of 15 or higher if you are going to be outside.    Stress management and coping skills are important to manage our stress levels. Some tips include keep in mind that stressFasr isn't a bad thing, talk about your problems, prioritize your responsibilities, focus on the basics, don't put all your eggs in one basket, set aside time for yourself, and keep things in perspective.     Stay up to date with annual eye exams and twice yearly dental exams.    Recommend annual flu vaccination, in addition to age appropriate recommended vaccines.  Flu shot and Covid booster today    For acid reflux symptoms, will submit to start Dexilant for persisting symptoms of gastritis  Continue to avoid trigger foods, over eating    Start mycolog for rash on legs/groin     Return for fasting blood work    Follow up for annual well check in 1 year.

## 2024-10-30 NOTE — PROGRESS NOTES
Subjective   Aaron Goldberg is a 44 y.o. male and is here for a comprehensive physical exam. The patient reports no problems.    Ongoing acid reflux    Sleeping still on and off with symptoms    CPAP stopped due to not working  Following with sleep medicine    Seeing dental regularly  Related to bite - no oral appliance - invisalign ?    Occasional headaches    Do you have pain that bothers you in your daily life? yes    Review of Systems   Constitutional:  Negative for appetite change, fatigue and unexpected weight change.   HENT:  Negative for congestion, ear pain and hearing loss.    Eyes:  Negative for visual disturbance.   Respiratory:  Negative for cough and shortness of breath.    Cardiovascular:  Negative for chest pain, palpitations and leg swelling.   Gastrointestinal:  Positive for abdominal pain. Negative for blood in stool, constipation, diarrhea, nausea and vomiting.   Genitourinary:  Negative for dysuria, frequency and urgency.   Musculoskeletal:  Negative for arthralgias, back pain and myalgias.   Skin:  Negative for rash.   Neurological:  Positive for headaches. Negative for syncope.   Psychiatric/Behavioral:  The patient is not nervous/anxious.         Objective   Physical Exam  Vitals and nursing note reviewed.   Constitutional:       General: He is not in acute distress.     Appearance: Normal appearance. He is not toxic-appearing.   HENT:      Head: Normocephalic.      Right Ear: Tympanic membrane, ear canal and external ear normal.      Left Ear: Tympanic membrane, ear canal and external ear normal.      Nose: Nose normal.      Mouth/Throat:      Mouth: Mucous membranes are moist.      Pharynx: Oropharynx is clear.   Eyes:      Conjunctiva/sclera: Conjunctivae normal.   Neck:      Thyroid: No thyromegaly.   Cardiovascular:      Rate and Rhythm: Normal rate and regular rhythm.      Pulses: Normal pulses.      Heart sounds: Normal heart sounds. No murmur heard.  Pulmonary:      Effort: Pulmonary  effort is normal. No respiratory distress.      Breath sounds: Normal breath sounds.   Abdominal:      General: Bowel sounds are normal.      Palpations: Abdomen is soft.      Tenderness: There is no abdominal tenderness.   Musculoskeletal:         General: Normal range of motion.      Cervical back: Neck supple.   Lymphadenopathy:      Cervical: No cervical adenopathy.   Skin:     General: Skin is warm and dry.      Capillary Refill: Capillary refill takes less than 2 seconds.      Findings: Rash (groin, inner thighs: erythema, excoriation) present.   Neurological:      General: No focal deficit present.      Gait: Gait normal.   Psychiatric:         Mood and Affect: Mood normal.         Judgment: Judgment normal.         Assessment/Plan   Healthy male exam.     1. Follow up with Sleep Medicine for sleep apnea management options  2. Patient Counseling:  --Nutrition: Stressed importance of moderation in sodium/caffeine intake, saturated fat and cholesterol, caloric balance, sufficient intake of fresh fruits, vegetables, fiber, calcium, iron, and 1 mg of folate supplement per day (for females capable of pregnancy).  --Discussed the issue of estrogen replacement, calcium supplement, and the daily use of baby aspirin.  --Exercise: Stressed the importance of regular exercise.   --Substance Abuse: Discussed cessation/primary prevention of tobacco, alcohol, or other drug use; driving or other dangerous activities under the influence; availability of treatment for abuse.    --Sexuality: Discussed sexually transmitted diseases, partner selection, use of condoms, avoidance of unintended pregnancy  and contraceptive alternatives.   --Injury prevention: Discussed safety belts, safety helmets, smoke detector, smoking near bedding or upholstery.   --Dental health: Discussed importance of regular tooth brushing, flossing, and dental visits.  --Immunizations reviewed.  --Discussed benefits of screening colonoscopy.  --After hours  service discussed with patient  3. Discussed the patient's BMI with him.  The BMI is above average. The patient received Provided instructions on dietary changes  Provided instructions on exercise  Advised to Increase physical activity because they have an above normal BMI.  4. Follow up next physical in 1 year    Patient Instructions   It is important that you have yearly check-ups with your healthcare provider to ensure that you stay up to date on your health, screenings, and vaccinations, even if you feel healthy.     Make sure you eat a diet rich in fruits, vegetables, nuts, beans, whole grains, lean meat, eggs, calcium, and good fats (i.e. olive oil, avocado baked fish). AVOID a diet that is high in red meat, trans- and saturated fats, sweetened beverages, and refined grains (i.e. white bread, rice, sweetened cereals).     Stay hydrated with at least 64 ounces of water daily.    Exercise most days per week, for at least 30-45 minutes per session.     Be sure to wear sunscreen of SPF of 15 or higher if you are going to be outside.    Stress management and coping skills are important to manage our stress levels. Some tips include keep in mind that stressFasr isn't a bad thing, talk about your problems, prioritize your responsibilities, focus on the basics, don't put all your eggs in one basket, set aside time for yourself, and keep things in perspective.     Stay up to date with annual eye exams and twice yearly dental exams.    Recommend annual flu vaccination, in addition to age appropriate recommended vaccines.  Flu shot and Covid booster today    For acid reflux symptoms, will submit to start Dexilant for persisting symptoms of gastritis  Continue to avoid trigger foods, over eating    Start mycolog for rash on legs/groin     Return for fasting blood work    Follow up for annual well check in 1 year.

## 2025-02-06 ASSESSMENT — ENCOUNTER SYMPTOMS
HEADACHES: 1
ABDOMINAL PAIN: 1

## 2025-03-09 ENCOUNTER — OFFICE VISIT (OUTPATIENT)
Dept: URGENT CARE | Age: 45
End: 2025-03-09
Payer: COMMERCIAL

## 2025-03-09 VITALS
TEMPERATURE: 97.3 F | HEART RATE: 83 BPM | DIASTOLIC BLOOD PRESSURE: 87 MMHG | OXYGEN SATURATION: 95 % | RESPIRATION RATE: 18 BRPM | SYSTOLIC BLOOD PRESSURE: 122 MMHG

## 2025-03-09 DIAGNOSIS — R21 RASH AND NONSPECIFIC SKIN ERUPTION: Primary | ICD-10-CM

## 2025-03-09 PROCEDURE — 99203 OFFICE O/P NEW LOW 30 MIN: CPT | Performed by: NURSE PRACTITIONER

## 2025-03-09 PROCEDURE — 1036F TOBACCO NON-USER: CPT | Performed by: NURSE PRACTITIONER

## 2025-03-09 RX ORDER — HYDROXYZINE PAMOATE 25 MG/1
25 CAPSULE ORAL 3 TIMES DAILY PRN
Qty: 21 CAPSULE | Refills: 0 | Status: SHIPPED | OUTPATIENT
Start: 2025-03-09 | End: 2025-03-16

## 2025-03-09 RX ORDER — METHYLPREDNISOLONE 4 MG/1
TABLET ORAL
Qty: 21 TABLET | Refills: 0 | Status: SHIPPED | OUTPATIENT
Start: 2025-03-09 | End: 2025-03-15

## 2025-03-09 ASSESSMENT — ENCOUNTER SYMPTOMS
ARTHRALGIAS: 0
MYALGIAS: 0
NUMBNESS: 0
JOINT SWELLING: 0
HEADACHES: 0
UNEXPECTED WEIGHT CHANGE: 0
ABDOMINAL PAIN: 0
SHORTNESS OF BREATH: 0
CHILLS: 0
FEVER: 0

## 2025-03-09 NOTE — PROGRESS NOTES
Subjective   Patient ID: Aaron Goldberg is a 44 y.o. male. They present today with a chief complaint of Rash (Red itchy spots all over patients body. Onset several weeks ago).    History of Present Illness  This is a 44-year-old male with a medical history of, but not limited to, anxiety who presents to the clinic today for evaluation of rash.  Patient states it started on his arm and has now migrated to the neck and trunk.  Denies new soaps, lotions, medications, pets, foods other than changing a hand .  This has spread over the last few weeks.  Denies increased work of breathing, use of accessory muscle, cyanosis, apnea, wheezing, stridor, shortness of breath.  Denies recent illness.  Denies fever, chills.      Rash  Pertinent negatives include no fever or shortness of breath.       Past Medical History  Allergies as of 03/09/2025 - Reviewed 03/09/2025   Allergen Reaction Noted    Quetiapine Other 09/15/2009    Ziprasidone Other 09/15/2009    Ziprasidone hcl Unknown 08/01/2007       (Not in a hospital admission)       Past Medical History:   Diagnosis Date    Personal history of other specified conditions 07/17/2022    History of diarrhea       Past Surgical History:   Procedure Laterality Date    EYE SURGERY  07/23/2013    Eye Surgery    OTHER SURGICAL HISTORY  10/31/2014    Surgery Testis        reports that he has never smoked. He has never used smokeless tobacco. He reports that he does not currently use alcohol. He reports that he does not use drugs.    Review of Systems  Review of Systems   Constitutional:  Negative for chills, fever and unexpected weight change.   Respiratory:  Negative for shortness of breath.    Cardiovascular:  Negative for chest pain.   Gastrointestinal:  Negative for abdominal pain.   Musculoskeletal:  Negative for arthralgias, joint swelling and myalgias.   Skin:  Positive for rash.   Neurological:  Negative for numbness and headaches.   All other systems reviewed and are  negative.                                 Objective    Vitals:    03/09/25 1222   BP: 122/87   Pulse: 83   Resp: 18   Temp: 36.3 °C (97.3 °F)   SpO2: 95%     No LMP for male patient.    Physical Exam  Vitals reviewed.   Constitutional:       General: He is not in acute distress.     Appearance: Normal appearance. He is not ill-appearing or toxic-appearing.   HENT:      Head: Normocephalic and atraumatic.      Mouth/Throat:      Mouth: Mucous membranes are moist.   Cardiovascular:      Rate and Rhythm: Normal rate and regular rhythm.   Pulmonary:      Effort: Pulmonary effort is normal.      Breath sounds: Normal breath sounds.   Musculoskeletal:      Cervical back: Normal range of motion and neck supple.   Skin:     General: Skin is warm and dry.      Capillary Refill: Capillary refill takes less than 2 seconds.      Findings: Rash present.   Neurological:      Mental Status: He is alert.   Psychiatric:         Mood and Affect: Mood is anxious.         Procedures    Point of Care Test & Imaging Results from this visit  No results found for this visit on 03/09/25.   No results found.    Diagnostic study results (if any) were reviewed by Kristin L Schoenlein, APRN-CNP.    Assessment/Plan   Allergies, medications, history, and pertinent labs/EKGs/Imaging reviewed by Kristin L Schoenlein, APRN-CNP.     Medical Decision Making  VSS, NAD, Nontoxic appearing.  Uvula midline, no trismus, even soft palate rise.  Lungs CTA throughout.  Patient has diffuse maculopapular rash noted on bilateral upper extremities and back.  It does not follow a dermatomal pattern.  Patient has areas of urticaria on the left side of his neck.  There is no central clearing, vesicles, bull's-eye appearance, crusting or oozing.  There are no signs and symptoms of infection at this time.    Symptoms, history, and exam are consistent with: Rash of unknown etiology.  Placed patient on Medrol pack and hydroxyzine for itching.  Strict ED precautions  reviewed.    Differential Dx include, however, are not limited to: Contact dermatitis, cellulitis, viral exanthem    I have a low suspicion for any acute pathologies requiring emergent evaluation and further workup at this time.  I believe patient is safe to discharge home with a low threshold for emergency room as discussed during visit.  We discussed close follow-up with primary care provider/pediatrician. Supportive care discussed.  Medication(s) profile of OTC and Rxed medication(s) if prescribed was (were) reviewed.  All questions answered and addressed.  Patient verbalized understanding.      Orders and Diagnoses  Diagnoses and all orders for this visit:  Rash and nonspecific skin eruption  -     methylPREDNISolone (Medrol Dospak) 4 mg tablets; Follow schedule on package instructions  -     hydrOXYzine pamoate (VistariL) 25 mg capsule; Take 1 capsule (25 mg) by mouth 3 times a day as needed for itching for up to 7 days.      Medical Admin Record      Patient disposition: Home    Electronically signed by Kristin L Schoenlein, APRN-CNP  2:43 PM

## 2025-03-09 NOTE — PATIENT INSTRUCTIONS
Thank you for letting me care for you today.  You have been seen today for rash.  We do not know why you have  the rash is at this time, I have placed you on a steroid (medrol osvaldo) and an antihistamine (hydroxyzine) .  If the antihistamine makes you too tired, please only take at night and you can take Zyrtec over-the-counter during the day.  please follow up with your primary care provider/pediatrician as directed.   If one is needed, please call [phone].  Please seek care in emergency room for red flags as discussed during visit.

## 2025-04-01 ASSESSMENT — ENCOUNTER SYMPTOMS
VOMITING: 0
NAIL CHANGES: 0
COUGH: 0
SHORTNESS OF BREATH: 0
FEVER: 0
DIARRHEA: 0
SORE THROAT: 0
EYE PAIN: 0
RHINORRHEA: 0
ANOREXIA: 0

## 2025-04-02 ENCOUNTER — APPOINTMENT (OUTPATIENT)
Dept: PRIMARY CARE | Facility: CLINIC | Age: 45
End: 2025-04-02
Payer: COMMERCIAL

## 2025-04-02 VITALS
HEART RATE: 74 BPM | TEMPERATURE: 98.1 F | OXYGEN SATURATION: 99 % | SYSTOLIC BLOOD PRESSURE: 122 MMHG | WEIGHT: 190.6 LBS | HEIGHT: 68 IN | BODY MASS INDEX: 28.89 KG/M2 | DIASTOLIC BLOOD PRESSURE: 76 MMHG

## 2025-04-02 DIAGNOSIS — L30.9 ECZEMA, UNSPECIFIED TYPE: Primary | ICD-10-CM

## 2025-04-02 DIAGNOSIS — L50.9 URTICARIA: ICD-10-CM

## 2025-04-02 DIAGNOSIS — F51.04 CHRONIC INSOMNIA: ICD-10-CM

## 2025-04-02 DIAGNOSIS — L30.9 ECZEMA, UNSPECIFIED TYPE: ICD-10-CM

## 2025-04-02 PROCEDURE — 1036F TOBACCO NON-USER: CPT | Performed by: NURSE PRACTITIONER

## 2025-04-02 PROCEDURE — 99214 OFFICE O/P EST MOD 30 MIN: CPT | Performed by: NURSE PRACTITIONER

## 2025-04-02 PROCEDURE — 3008F BODY MASS INDEX DOCD: CPT | Performed by: NURSE PRACTITIONER

## 2025-04-02 RX ORDER — DIAZEPAM 2 MG/1
2 TABLET ORAL NIGHTLY PRN
Qty: 30 TABLET | Refills: 0 | Status: SHIPPED | OUTPATIENT
Start: 2025-04-02 | End: 2025-07-01

## 2025-04-02 RX ORDER — TRIAMCINOLONE ACETONIDE 1 MG/G
CREAM TOPICAL 2 TIMES DAILY
Qty: 80 G | Refills: 0 | Status: SHIPPED | OUTPATIENT
Start: 2025-04-02 | End: 2025-04-02 | Stop reason: SDUPTHER

## 2025-04-02 RX ORDER — TRIAMCINOLONE ACETONIDE 1 MG/G
CREAM TOPICAL 2 TIMES DAILY
Qty: 80 G | Refills: 0 | Status: SHIPPED | OUTPATIENT
Start: 2025-04-02

## 2025-04-02 ASSESSMENT — ENCOUNTER SYMPTOMS
FEVER: 0
RHINORRHEA: 0
ANOREXIA: 0
VOMITING: 0
NAIL CHANGES: 0
SHORTNESS OF BREATH: 0
SORE THROAT: 0
COUGH: 0
EYE PAIN: 0
DIARRHEA: 0

## 2025-04-02 ASSESSMENT — PAIN SCALES - GENERAL: PAINLEVEL_OUTOF10: 2

## 2025-04-02 NOTE — LETTER
April 2, 2025     Patient: Aaron Goldberg   YOB: 1980   Date of Visit: 4/2/2025       To Whom It May Concern:    Aaron Goldberg was seen in my clinic on 4/2/2025 at 8:40 am. Please excuse James for his absence from work on this day to make the appointment.    If you have any questions or concerns, please don't hesitate to call.         Sincerely,         Heidi Yoo, GIOVANNA-CNP        CC: No Recipients

## 2025-04-02 NOTE — PATIENT INSTRUCTIONS
Refer to Dermatology for evaluation of rash  Will give Triamcinolone to use on forearms, wrists, hands, ankle, legs as needed  Use mycolog (groin cream) for the neck area    Insomnia: trial of diazepam at bedtime  We can revisit CPAP or dose adjustment based on results of diazepam trial.

## 2025-04-02 NOTE — PROGRESS NOTES
"Subjective   Patient ID: Aaron Goldberg is a 44 y.o. male who presents for Rash (Generalized over body/).    Presents for rash on and off x months  Seen in  3/9/25 for rash. Diagnosed with urticaria.Treated with medrol, hydroxyzine, not helping  +itching  Rash has been on and off since end of 2024  Never goes away, changes location    Insomnia persisting  Under the care of Sleep Medicine at [facility]  Had used CPAP for a few months, did not help  Hydroxyzine helped a few times, but not consistently  Last psychiatry visit last week - has not seen counselor in while  Occasionally taking amitriptyline - variably helpful  Has had multiple treatment failures.    Rash  This is a new problem. The current episode started more than 1 month ago. The problem has been waxing and waning since onset. The rash is diffuse. The rash is characterized by redness. It is unknown if there was an exposure to a precipitant. Pertinent negatives include no anorexia, congestion, cough, diarrhea, eye pain, facial edema, fever, joint pain, nail changes, rhinorrhea, shortness of breath, sore throat or vomiting.       Review of Systems   Constitutional:  Negative for fever.   HENT:  Negative for congestion, rhinorrhea and sore throat.    Eyes:  Negative for pain.   Respiratory:  Negative for cough and shortness of breath.    Gastrointestinal:  Negative for anorexia, diarrhea and vomiting.   Musculoskeletal:  Negative for joint pain.   Skin:  Positive for rash. Negative for nail changes.       Objective     /76 (BP Location: Left arm, Patient Position: Sitting, BP Cuff Size: Adult)   Pulse 74   Temp 36.7 °C (98.1 °F) (Temporal)   Ht 1.727 m (5' 8\")   Wt 86.5 kg (190 lb 9.6 oz)   SpO2 99%   BMI 28.98 kg/m²      Physical Exam  Vitals and nursing note reviewed.   Constitutional:       General: He is not in acute distress.     Appearance: Normal appearance.   Cardiovascular:      Rate and Rhythm: Normal rate and regular rhythm.      Heart " sounds: Normal heart sounds. No murmur heard.  Pulmonary:      Effort: Pulmonary effort is normal. No respiratory distress.      Breath sounds: Normal breath sounds.   Skin:     Findings: Rash present. Rash is macular (Neck:scattered pink fine papules) and papular (scattered red papules: right forearm, wrist, posterior hand, bilateral  hands).      Comments: 3 large birth marks on back     Psychiatric:         Mood and Affect: Affect normal. Mood is anxious.       Assessment/Plan   Diagnoses and all orders for this visit:  Eczema, unspecified type  -     Referral to Dermatology  Urticaria  -     Referral to Dermatology  Chronic insomnia  -     diazePAM (Valium) 2 mg tablet; Take 1 tablet (2 mg) by mouth as needed at bedtime for anxiety, sedation or muscle spasms.      Patient Instructions   Refer to Dermatology for evaluation of rash  Will give Triamcinolone to use on forearms, wrists, hands, ankle, legs as needed  Use mycolog (groin cream) for the neck area    Insomnia: trial of diazepam at bedtime  We can revisit CPAP or dose adjustment based on results of diazepam trial.

## 2025-04-03 ENCOUNTER — TELEPHONE (OUTPATIENT)
Dept: PRIMARY CARE | Facility: CLINIC | Age: 45
End: 2025-04-03
Payer: COMMERCIAL

## 2025-04-03 PROBLEM — S93.409A ANKLE SPRAIN: Status: RESOLVED | Noted: 2023-10-30 | Resolved: 2025-04-03

## 2025-04-15 DIAGNOSIS — L30.9 ECZEMA, UNSPECIFIED TYPE: ICD-10-CM

## 2025-04-15 RX ORDER — TRIAMCINOLONE ACETONIDE 1 MG/G
CREAM TOPICAL
Qty: 80 G | Refills: 0 | Status: SHIPPED | OUTPATIENT
Start: 2025-04-15

## 2025-05-06 DIAGNOSIS — F51.04 CHRONIC INSOMNIA: Primary | ICD-10-CM

## 2025-05-06 DIAGNOSIS — F51.04 CHRONIC INSOMNIA: ICD-10-CM

## 2025-05-06 RX ORDER — DIAZEPAM 5 MG/1
5 TABLET ORAL NIGHTLY PRN
Qty: 30 TABLET | Refills: 0 | Status: SHIPPED | OUTPATIENT
Start: 2025-05-06 | End: 2025-05-06 | Stop reason: SDUPTHER

## 2025-05-07 RX ORDER — DIAZEPAM 5 MG/1
5 TABLET ORAL NIGHTLY PRN
Qty: 30 TABLET | Refills: 0 | Status: SHIPPED | OUTPATIENT
Start: 2025-05-07 | End: 2025-08-05

## 2025-05-15 DIAGNOSIS — F41.9 ANXIETY: ICD-10-CM

## 2025-05-15 DIAGNOSIS — F51.04 CHRONIC INSOMNIA: Primary | ICD-10-CM

## 2025-05-15 RX ORDER — QUETIAPINE FUMARATE 25 MG/1
25 TABLET, FILM COATED ORAL NIGHTLY
Qty: 30 TABLET | Refills: 1 | Status: SHIPPED | OUTPATIENT
Start: 2025-05-15 | End: 2025-07-14

## 2025-05-22 DIAGNOSIS — F41.1 GENERALIZED ANXIETY DISORDER: Primary | ICD-10-CM

## 2025-05-22 DIAGNOSIS — R21 RASH AND NONSPECIFIC SKIN ERUPTION: Primary | ICD-10-CM

## 2025-05-22 DIAGNOSIS — F41.9 ANXIETY: ICD-10-CM

## 2025-05-22 RX ORDER — BUSPIRONE HYDROCHLORIDE 5 MG/1
5 TABLET ORAL 2 TIMES DAILY
Qty: 60 TABLET | Refills: 2 | Status: SHIPPED | OUTPATIENT
Start: 2025-05-22 | End: 2025-08-20

## 2025-05-23 PROBLEM — H66.90 OTITIS MEDIA: Status: RESOLVED | Noted: 2025-05-23 | Resolved: 2025-05-23

## 2025-05-23 PROBLEM — R10.13 EPIGASTRIC PAIN: Status: RESOLVED | Noted: 2025-05-23 | Resolved: 2025-05-23

## 2025-05-23 PROBLEM — T16.9XXA: Status: RESOLVED | Noted: 2025-05-23 | Resolved: 2025-05-23

## 2025-05-23 PROBLEM — M25.519 SHOULDER PAIN: Status: RESOLVED | Noted: 2025-05-23 | Resolved: 2025-05-23

## 2025-06-10 DIAGNOSIS — F51.04 CHRONIC INSOMNIA: ICD-10-CM

## 2025-06-10 DIAGNOSIS — F41.9 ANXIETY: ICD-10-CM

## 2025-06-10 RX ORDER — QUETIAPINE FUMARATE 25 MG/1
25 TABLET, FILM COATED ORAL NIGHTLY
Qty: 30 TABLET | Refills: 1 | Status: SHIPPED | OUTPATIENT
Start: 2025-06-10 | End: 2025-08-09

## 2025-06-11 ENCOUNTER — TELEPHONE (OUTPATIENT)
Dept: PRIMARY CARE | Facility: CLINIC | Age: 45
End: 2025-06-11
Payer: COMMERCIAL

## 2025-06-11 LAB
CHOLEST SERPL-MCNC: 151 MG/DL
CHOLEST/HDLC SERPL: 2.8 (CALC)
HDLC SERPL-MCNC: 54 MG/DL
LDLC SERPL CALC-MCNC: 82 MG/DL (CALC)
NONHDLC SERPL-MCNC: 97 MG/DL (CALC)
TRIGL SERPL-MCNC: 70 MG/DL

## 2025-06-11 NOTE — TELEPHONE ENCOUNTER
Patient called, SEROquel and Buspirone is not working. Not helping with sleep or anxiety. States Buspirone is helping during the day for anxiety but not at night. Please advise

## 2025-07-07 PROBLEM — R06.83 SNORING: Status: RESOLVED | Noted: 2023-10-30 | Resolved: 2025-07-07

## 2025-07-07 NOTE — PROGRESS NOTES
[facility]  [facility]  [address]  [facility] [address]  [phone]     [facility]  New Visit Note      Subjective   Patient ID: Aaron Goldberg is a 44 y.o. male with past medical history significant for Overweight, Allergic Rhinitis, Gastroesophageal reflux disease, Anxiety, Depression, Obsessive-compulsive disorder, Insomnia, and OBSTRUCTIVE SLEEP APNEA.    7/14/2025: The patient is here alone today and was referred by PCP Heidi Yoo, APRN-CNP, for a comprehensive sleep medicine evaluation due to excessive daytime sleepiness and fatigue, as well as difficulty falling/staying asleep. He reports that he did not mind restarting using his PAP to control his sleep apnea. I instructed him to bring his old pap to the clinic to review his pap titration with him at his next visit and provided him with a suitable mask to try in the clinic; he verbalized understanding of it. His ESS: 8,  BARAN:  19  today.    HPI  Patient was diagnosed with AIDE in 2021 by Home Sleep Study  and was using CPAP and full face mask intermittently for 1 year then stopped due to  pap intolerance, nocturia, and tiredness.      SLEEP STUDY HISTORY: (personally reviewed raw data such as interpretation report, data sheet, hypnogram, and titration table if available and applicable)  4/7/2021: Home Sleep Study: AHI : 2.8/hr, Andreas: 86%  8/16/2021: PSG: [facility]: BMI: 28.3, AHI : 12.7/hr, Andreas: 90%<     SLEEP-WAKE SCHEDULE  Bedtime: 10:30 PM on weekdays, varies on weekends  Subjective sleep latency: 1 hour  Problems falling asleep: No  Number of awakenings: 3-4  times per night spontaneously for BR  Falls back asleep in <10 minutes  Problems staying asleep: Yes  Final wake time: 6:15 AM on weekdays, 9:30 AM on weekends  Naps: No  Average sleep duration (excluding naps): 6-7 hours    SLEEP ENVIRONMENT  Sleep location:  bed  Sleep status: sleeps alone  Room is dark:  Yes  Room is quiet: Yes  Room is cool: Yes  Bed comfort: good    SLEEP HABITS:   Activities before bedtime: use PC, and watch TV  Activities in bed: no TV  Preferred sleep position: side    SLEEP ROS:  Night symptoms: POSITIVE for snoring, witnessed apnea, heartburn or sour taste in mouth at night, and nocturia  Morning symptoms: POSITIVE for unrefreshing sleep, morning headache, and morning dry mouth  Daytime symptoms POSITIVE for excessive daytime sleepiness and fatigue  Hypersomnia / narcolepsy symptoms: Patient denies symptoms of a hypersomnolence disorder such as sleep paralysis, sleep-related hallucinations, and cataplexy.   RLS symptoms: Patient denies RLS symptoms.  Movements in sleep: Patient denies problematic movements in sleep such as seizures during sleep, frequent leg kicks / jerks while asleep, sleep-related bruxism, and waking up with bedsheets in disarray.  Parasomnia symptoms: Patient denies symptoms of parasomnia such as sleepwalking, sleeptalking, sleep-eating, acting out dreams, and nightmares.     WEIGHT: stable in 6 months    REVIEW OF SYSTEMS: All other systems have been reviewed and are negative.    PERTINENT SOCIAL HISTORY:  Occupation:    Smoking: No   ETOH: socially  Marijuana: No   Caffeine: Yes, 1-2 cups tea/ soda in the morning, sometimes at 5 pm  Sleep aids: Yes  and Seroquel  Claustrophobia: Yes  and a little bit    PERTINENT PAST SURGICAL HISTORY:  non-contributory    PERTINENT FAMILY HISTORY:  sleep apnea- father  Insomnia: father    Active Problems, Allergy List, Medication List, and PMH/PSH/FH/Social Hx have been reviewed and reconciled in chart. No significant changes unless documented in the pertinent chart section. Updates made when necessary.       Objective   Vitals:    07/14/25 1452   BP: 115/78   Pulse: 72   Temp: 37 °C (98.6 °F)   SpO2: 97%      REVIEW OF SYSTEMS  All other systems have been reviewed and  are negative.    ALLERGIES  Allergies[1]    MEDICATIONS  Current Medications[2]      Physical Exam  Constitutional:alert and oriented to time, place, and person  Sinus: - tenderness to palpation  Palate: Narrow Mallampati 3, + Tongue scalloping, + macroglossia  Lungs: Clear to auscultation bilateral, no rales  Heart: Regular rate and rhythm, no murmurs    Assessment/Plan   Aaron Goldberg is a 44 y.o. male who is seen to evaluate for mild obstructive sleep apnea. The pathophysiology of sleep apnea, diagnostic testing (HST vs PSG), treatment options (PAP, oral appliance, surgery, hypoglossal nerve stimulator called Inspire), and supportive management (weight loss, positional therapy, smoking cessation, avoidance of alcohol and sedatives) were discussed with the patient in detail. Risk factors of sleep apnea as well as cardiometabolic and neurocognitive sequelae associated with untreated sleep apnea were also discussed. Lastly, patient was advised to avoid driving vehicle or operating heavy machinery when sleepy.     Aaron Goldberg with the following problems:     #OBSTRUCTIVE SLEEP APNEA :  -Proceed with pap titration sleep study to get the appropriate pressure setting.  -Sleep apnea and PAP therapy education were provided at length in the clinic today. Jameson Goldberg verbalized understanding.  -Emphasized diet, exercise, and weight loss in the clinic, as were non-supine sleep, avoiding alcohol in the late evening, and driving or operating heavy machinery when sleepy.  -Aaron Goldbergverbalizes understanding of the above instructions and risks.    # CHRONIC SLEEP ONSET/ SLEEP MAINTENANCE INSOMNIA:  -likely due to poor sleep hygiene, irregular sleep schedule, depression, anxiety, untreated sleep apnea, nocturia, RLS, delayed sleep phase syndrome, and chronic pain. [Meds] may be a contributing factor as well.  -ABRAN:  -Sleep hygiene discuss in the clinic.    # DEPRESSION and ANXIETY:   -Aaron Goldbergis taking busPIRone  "(Buspar) 5 mg, amitriptyline (Elavil) 25 mg and participating in psychotherapy.  -Denies HI/SI     # OVERWEIGHT:  -with a BMI of 28.74.. Aaron Goldberg most recent Bicarbonate was 26  Bicarbonate   Date Value Ref Range Status   07/29/2024 26 21 - 32 mmol/L Final   -Encourage to have regular exercise to manage weight well.    # XEROSTOMIA:  -Instruct Aaron Goldbergto purchase the Biotene gel to ease the dry mouth symptom,     Follow up 3 months      All of patient's questions were answered. He verbalizes understanding and agreement with my assessment and plan.    Please excuse any errors in grammar or translation related to dictation. Thanks.         [1]   Allergies  Allergen Reactions    Quetiapine Other     Weight Gain    Ziprasidone Other     he feels like he is having a stroke.    Reports \"seizure like symptoms\"    Ziprasidone Hcl Unknown   [2]   Current Outpatient Medications   Medication Sig Dispense Refill    acetaminophen 500 mg powder in packet Take by mouth.      amitriptyline (Elavil) 25 mg tablet Take 1 tablet (25 mg) by mouth early in the morning..      busPIRone (Buspar) 5 mg tablet Take 1 tablet (5 mg) by mouth 2 times a day. 60 tablet 2    dexlansoprazole (Dexilant) 60 mg DR capsule Take 1 capsule (60 mg) by mouth once daily. Do not crush or chew. 30 capsule 5    fluticasone (Flonase) 50 mcg/actuation nasal spray Administer 1 spray into each nostril once daily. Shake gently. Before first use, prime pump. After use, clean tip and replace cap. 16 g 5    hydrOXYzine pamoate (VistariL) 25 mg capsule Take 1 capsule (25 mg) by mouth 3 times a day as needed for itching for up to 7 days. 21 capsule 0    nystatin-triamcinolone (Mycolog II) cream Apply topically 2 times a day. Apply to affect area twice a day for 7-14 days then as needed for rash. 60 g 1    QUEtiapine (SEROquel) 25 mg tablet Take 1 tablet (25 mg) by mouth once daily at bedtime. 30 tablet 1    sertraline (Zoloft) 50 mg tablet Take 1 tablet (50 " mg) by mouth once daily at bedtime.      triamcinolone (Kenalog) 0.1 % cream APPLY TWICE DAILY AS NEEDED. AVOID FACE AND GROIN 80 g 0     No current facility-administered medications for this visit.

## 2025-07-14 ENCOUNTER — OFFICE VISIT (OUTPATIENT)
Dept: SLEEP MEDICINE | Facility: HOSPITAL | Age: 45
End: 2025-07-14
Payer: COMMERCIAL

## 2025-07-14 VITALS
WEIGHT: 189 LBS | DIASTOLIC BLOOD PRESSURE: 78 MMHG | TEMPERATURE: 98.6 F | BODY MASS INDEX: 28.74 KG/M2 | SYSTOLIC BLOOD PRESSURE: 115 MMHG | OXYGEN SATURATION: 97 % | HEART RATE: 72 BPM

## 2025-07-14 DIAGNOSIS — K21.9 GASTROESOPHAGEAL REFLUX DISEASE, UNSPECIFIED WHETHER ESOPHAGITIS PRESENT: ICD-10-CM

## 2025-07-14 DIAGNOSIS — F51.04 CHRONIC INSOMNIA: ICD-10-CM

## 2025-07-14 DIAGNOSIS — G47.33 OBSTRUCTIVE SLEEP APNEA SYNDROME: Primary | ICD-10-CM

## 2025-07-14 DIAGNOSIS — F32.A DEPRESSIVE DISORDER: ICD-10-CM

## 2025-07-14 DIAGNOSIS — F41.9 ANXIETY: ICD-10-CM

## 2025-07-14 DIAGNOSIS — F84.0 AUTISM (HHS-HCC): ICD-10-CM

## 2025-07-14 DIAGNOSIS — J30.9 ALLERGIC RHINITIS, UNSPECIFIED SEASONALITY, UNSPECIFIED TRIGGER: ICD-10-CM

## 2025-07-14 DIAGNOSIS — F42.9 OBSESSIVE-COMPULSIVE DISORDER, UNSPECIFIED TYPE: ICD-10-CM

## 2025-07-14 PROBLEM — R53.83 FATIGUE: Status: RESOLVED | Noted: 2022-11-08 | Resolved: 2025-07-14

## 2025-07-14 PROCEDURE — 1036F TOBACCO NON-USER: CPT

## 2025-07-14 PROCEDURE — 99213 OFFICE O/P EST LOW 20 MIN: CPT

## 2025-07-14 PROCEDURE — 99203 OFFICE O/P NEW LOW 30 MIN: CPT

## 2025-07-14 RX ORDER — CITALOPRAM 20 MG/1
20 TABLET ORAL DAILY
COMMUNITY

## 2025-07-14 ASSESSMENT — SLEEP AND FATIGUE QUESTIONNAIRES
SLEEP_PROBLEM_NOTICEABLE_TO_OTHERS: VERY MUCH NOTICEABLE
HOW LIKELY ARE YOU TO NOD OFF OR FALL ASLEEP WHILE SITTING AND TALKING TO SOMEONE: WOULD NEVER DOZE
WORRIED_DISTRESSED_DUE_TO_SLEEP: MUCH
SATISFACTION_WITH_CURRENT_SLEEP_PATTERN: SATISFIED
HOW LIKELY ARE YOU TO NOD OFF OR FALL ASLEEP IN A CAR, WHILE STOPPED FOR A FEW MINUTES IN TRAFFIC: WOULD NEVER DOZE
HOW LIKELY ARE YOU TO NOD OFF OR FALL ASLEEP WHILE SITTING QUIETLY AFTER LUNCH WITHOUT ALCOHOL: SLIGHT CHANCE OF DOZING
SITING INACTIVE IN A PUBLIC PLACE LIKE A CLASS ROOM OR A MOVIE THEATER: MODERATE CHANCE OF DOZING
WAKING_TOO_EARLY: VERY SEVERE
DIFFICULTY_FALLING_ASLEEP: SEVERE
HOW LIKELY ARE YOU TO NOD OFF OR FALL ASLEEP WHILE LYING DOWN TO REST IN THE AFTERNOON WHEN CIRCUMSTANCES PERMIT: SLIGHT CHANCE OF DOZING
HOW LIKELY ARE YOU TO NOD OFF OR FALL ASLEEP WHEN YOU ARE A PASSENGER IN A CAR FOR AN HOUR WITHOUT A BREAK: SLIGHT CHANCE OF DOZING
HOW LIKELY ARE YOU TO NOD OFF OR FALL ASLEEP WHILE SITTING AND READING: MODERATE CHANCE OF DOZING
HOW LIKELY ARE YOU TO NOD OFF OR FALL ASLEEP WHILE WATCHING TV: SLIGHT CHANCE OF DOZING
ESS-CHAD TOTAL SCORE: 8
SLEEP_PROBLEM_INTERFERES_DAILY_ACTIVITIES: A LITTLE
DIFFICULTY_STAYING_ASLEEP: SEVERE

## 2025-07-14 ASSESSMENT — LIFESTYLE VARIABLES
HOW MANY STANDARD DRINKS CONTAINING ALCOHOL DO YOU HAVE ON A TYPICAL DAY: PATIENT DOES NOT DRINK
HOW OFTEN DO YOU HAVE A DRINK CONTAINING ALCOHOL: NEVER
AUDIT-C TOTAL SCORE: 0
HOW OFTEN DO YOU HAVE SIX OR MORE DRINKS ON ONE OCCASION: NEVER
SKIP TO QUESTIONS 9-10: 1

## 2025-07-14 ASSESSMENT — PAIN SCALES - GENERAL: PAINLEVEL_OUTOF10: 0-NO PAIN

## 2025-07-14 NOTE — LETTER
July 14, 2025     Patient: Aaron Goldberg   YOB: 1980   Date of Visit: 7/14/2025       To Whom It May Concern:    Aaron Goldberg was seen in my clinic on 7/14/2025 at 3:00 pm. Please excuse James for his absence from work on this day to make the appointment.    If you have any questions or concerns, please don't hesitate to call.         Sincerely,         GIOVANNA Carrizales-CNP        CC: No Recipients

## 2025-07-14 NOTE — PATIENT INSTRUCTIONS
[facility] Sleep Medicine  [facility]  [address]  [facility] [id number] [phone]       Thank you for coming to the Sleep Medicine Clinic today! Your sleep medicine provider today was: ANDREE Carrizales Below is a summary of your treatment plan, patient education, other important information, and our contact numbers.    Dear Mr. Aaron Goldberg       Your Sleep Provider Today: ANDREE Carrizales  Your Primary Care Physician: ANDREE Serna   Your Referring Provider: Heidi Yoo APRN-CNP    Diagnosis: OBSTRUCTIVE SLEEP APNEA       Thank you for visiting  Sleep Medicine Clinic !     1. We will proceed with a PAP TITRATION sleep study to check your risk of sleep apnea. The lab will call you and schedule it for you.     2. Please do not drive when you are sleepy and start practicing the sleep hygiene as discussed in clinic.    3. FOR QUESTIONS AND CONCERNS:   a) : To schedule, cancel, or reschedule SLEEP STUDY appointments, please call [phone]  b): Please call my office with issues or questions: [phone] ([place]); [phone] ([facility]); [phone] ([place])    If you have a CPAP or BiPAP machine at home, please bring the unit and all accessories including the power cord to your appointments unless I tell you otherwise. Please have knowledge of the DME company you worked with to receive your PAP device. If you have copies of any previous sleep testing completed outside of , please bring with you to clinic as well. This information will make our visits more productive.     If you are new to CPAP or BiPAP, please note the minimum usage insurance requires to continuing coverage for the equipment as noted by your DME company. Please discuss equipment issues (PAP unit, mask fit, humidification, etc.) with your DME company first.       In the event that you are running more than 10 minutes late to your  appointment, I will kindly ask you to reschedule. Thanks.      TREATMENT PLAN     - Do the following testing: in-lab sleep study    Follow-up Appointment:   Follow-up in 3 months    PATIENT EDUCATION     OBSTRUCTIVE SLEEP APNEA (AIDE) is a sleep disorder where your upper airway muscles relax during sleep and the airway intermittently and repetitively narrows and collapses leading to partially blocked airway (hypopnea) or completely blocked airway (apnea) which, in turn, can disrupt breathing in sleep, lower oxygen levels while you sleep and cause night time wakings. Because both apnea and hypopnea may cause higher carbon dioxide or low oxygen levels, untreated AIDE can lead to heart arrhythmia, elevation of blood pressure, and make it harder for the body to consolidate memory and facilitate metabolism (leading to higher blood sugars at night). Frequent partial arousals occur during sleep resulting in sleep deprivation and daytime sleepiness. AIDE is associated with an increased risk of cardiovascular disease, stroke, hypertension, and insulin resistance. Moreover, untreated AIDE with excessive daytime sleepiness can increase the risk of motor vehicular accidents.    Below are conservative strategies for AIDE regardless of AIDE severity are:   Positional therapy - Avoid sleeping on your back.   Healthy diet and regular exercise to optimize weight is highly encouraged.   Avoid alcohol late in the evening and sedative-hypnotics as these substances can make sleep apnea worse.   Improve breathing through the nose with intranasal steroid spray, saline rinse, or antihistamines    Safety: Avoid driving vehicle and operating heavy equipment while sleepy. Drowsy driving may lead to life-threatening motor vehicle accidents. A person driving while sleepy is 5 times more likely to have an accident. If you feel sleepy, pull over and take a short power nap (sleep for less than 30 minutes). Otherwise, ask somebody to drive  you.    Treatment options for sleep apnea include weight management, positional therapy, Positive Airway Therapy (PAP) therapy, oral appliance therapy, hypoglossal nerve stimulator (Inspire) and select airway surgeries.    Sleep Testing for sleep apnea: The best and ideal way to check out if you have sleep apnea is to do an overnight sleep study in the sleep laboratory. Alternatively, a home sleep apnea test can also be done depending on your insurance and risk factors.     If you are having a home sleep apnea test, kindly allot 1 hour during pickup of the testing kit as you will have to complete paperwork and listen to the sleep technician for in-person on-the-spot demonstration and instructions on how to hook up the testing kit at home. Do the test for 1 day and start off with sleeping on your back. If you sleep on your side in the middle of night or you have always been a side or stomach-sleeper, it is ok as long as you have some time on your back and off-back.     If you are having an overnight in-lab sleep study, please make sure to bring toiletries, a comfy pillow, additional warm blankets, and any nighttime medications (include as-needed inhaler, pain pill, etc) that you may regularly take. Also, be sure to eat dinner before you arrive as we generally do not provide meals inside the sleep testing center. Lastly, in order to fall asleep faster in the sleep testing center, we advise patients to wake up 2 hours earlier on the morning of scheduled testing and avoid napping 2 days prior testing. Sometimes, your sleep provider may prescribe a sleep aid to be taken at lights out in the sleep testing center. If you are taking a sleep aid, consider having somebody pick you up after the sleep testing.    Overnight sleep studies may be scheduled on a weekday or weekend. We also perform daytime testing for shift workers on a case-by-case basis.    Once you have booked an appointment to do the sleep study, please contact  my office for follow-up visit to discuss results.    On the other hand, if you have any of the following, please consider calling the sleep testing center to RESCHEDULE your sleep study appointment:  If you tested positive for COVID within 10 days of your sleep study appointment.  If you were exposed to somebody who was confirmed for COVID within 10 days of your sleep study appointment and now you are having symptoms of possible COVID  If you have fever>100F or any acute symptoms that you think will lead to poor sleep during testing (e.g. new or worsening stuffy nose not relieved by steroid nasal spray)  If you have traveled domestically or internationally in the last month and now you are having symptoms of possible COVID    You can also go to the following EDUCATION WEBSITES for further information:   American Academy of Sleep Medicine http://sleepeducation.org  National Sleep Foundation: https://sleepfoundation.org  American Sleep Apnea Association: https://www.sleepapnea.org (for patients with sleep apnea)  Narcolepsy Network: https://www.narcolepsynetwork.org (for patients with narcolepsy)  [employer]: https://www.wakeupnarcolepsy.org (for patients with narcolepsy)  Hypersomnia Foundation: https://www.hypersomniafoundation.org (for patients with idiopathic hypersomnia)  RLS foundation: https://www.rls.org (for patients with restless leg syndrome)    IMPORTANT INFORMATION     Call 911 for medical emergencies.  Our offices are generally open from Monday-Friday, 8 am - 5 pm.   There are no supporting services by either the sleep doctors or their staff on weekends and Holidays, or after 5 PM on weekdays.   If you need to get in touch with me, you may either call my office number or you can use [employer].  If a referral for a test, for CPAP, or for another specialist was made, and you have not heard about scheduling this within a week, please call scheduling at [phone] ([phone]).  If you are unable to make  your appointment for clinic or an overnight study, kindly call the office or sleep testing center at least 48 hours in advance to cancel and reschedule.  If you are on CPAP, please bring your device's card and/or the device to each clinic appointment.   In case of problems with PAP machine or mask interface, please contact your DME (Durable Medical Equipment) company first. DME is the company who provides you the machine and/or PAP supplies.       PRESCRIPTIONS     We require 7 days advanced notice for prescription refills. If we do not receive the request in this time, we cannot guarantee that your medication will be refilled in time.    IMPORTANT PHONE NUMBERS     Sleep Medicine Clinic Fax: [phone]  Appointments (for Pediatric Sleep Clinic): [phone] - option 1  Appointments (for Adult Sleep Clinic): [phone] - option 2  Appointments (For Sleep Studies): [phone] - option 3  Behavioral Sleep Medicine: [phone]  Sleep Surgery: [phone]  Nutrition Service: [phone]  Weight management clinics with endocrinology: [phone]  Bariatric Services: [phone] (includes weight loss medications and weight loss surgery)  [facility]: [phone] (offers holistic approaches to weight management)  ENT (Otolaryngology): [phone]  Headache Clinic (Neurology): [phone]  Neurology: [phone]  Psychiatry: [phone]  Pulmonary Function Testing (PFT) Center: [phone]  Pulmonary Medicine: [phone]  [employer] Company (DME): [phone]      OUR SLEEP TESTING LOCATIONS     Our team will contact you to schedule your sleep study, however, you can contact us as follow:  Main Phone Line (scheduling only): [phone]), option 3  Adult and Pediatric Locations  [facility] (6 years and older): [facility] - 4th floor ([address] [place] [address]) After hours line: [phone]  [facility] at  "[facility] (Main campus: All ages): [facility], 6th floor. After hours line: [phone]   [place] (5 years and older; younger considered on case-by-case basis): [address]; [address], [address]. Scheduling  After hours line: [phone]   [place] (6 years and older): [address]; [address]; [address]   [place] (6 years and older): [address], [address]  After hours line: [phone]   [place] (13 years and older) in [place]: [address], 2nd floor  After hours line: [phone]   [place] (13 year and older): [address]  After hours line: [phone]     Adult Only Locations:   [place] (18 years and older): [address] [facility], 2nd floor   [place] (18 years and older): [address] [facility]; 4th floor  After hours line: [phone]   [place] (18 years and older) at [place]: [address] [facility]  After hours line: [phone]     CONTACTING YOUR SLEEP MEDICINE PROVIDER AND SLEEP TEAM      For issues with your machine or mask interface, please call your DME provider first. DME stands for durable medical company. DME is the company who provides you the machine and/or PAP supplies / accessories.   To schedule, cancel, or reschedule SLEEP STUDY APPOINTMENTS, please call the Main Phone Line at [phone] - option 3.   To schedule, cancel, or reschedule CLINIC APPOINTMENTS, you can do it in \"MyChart\", call [phone] to speak with my  (Melanie Pagan), or call the Main Phone Line at [phone] - option 2  For CLINICAL QUESTIONS or MEDICATION REFILLS, please call direct line for Adult Sleep Nurses at [phone].   Lastly, you can also send a message directly to your provider through \"My Chart\", which is the email service through your  Records Account: [email or web address]       Here at [facility], we wish you a restful sleep!   "

## 2025-08-14 DIAGNOSIS — F41.1 GENERALIZED ANXIETY DISORDER: ICD-10-CM

## 2025-08-14 RX ORDER — BUSPIRONE HYDROCHLORIDE 5 MG/1
5 TABLET ORAL 2 TIMES DAILY
Qty: 60 TABLET | Refills: 2 | Status: SHIPPED | OUTPATIENT
Start: 2025-08-14 | End: 2025-11-12

## 2025-08-15 ENCOUNTER — APPOINTMENT (OUTPATIENT)
Dept: OPHTHALMOLOGY | Facility: CLINIC | Age: 45
End: 2025-08-15
Payer: COMMERCIAL

## 2025-09-04 ENCOUNTER — APPOINTMENT (OUTPATIENT)
Dept: DERMATOLOGY | Facility: CLINIC | Age: 45
End: 2025-09-04
Payer: COMMERCIAL

## 2025-09-05 ENCOUNTER — APPOINTMENT (OUTPATIENT)
Dept: PRIMARY CARE | Facility: CLINIC | Age: 45
End: 2025-09-05
Payer: COMMERCIAL